# Patient Record
Sex: MALE | Race: WHITE | NOT HISPANIC OR LATINO | Employment: OTHER | URBAN - METROPOLITAN AREA
[De-identification: names, ages, dates, MRNs, and addresses within clinical notes are randomized per-mention and may not be internally consistent; named-entity substitution may affect disease eponyms.]

---

## 2017-11-13 NOTE — PROCEDURE NOTE: CLINICAL
PROCEDURE NOTE: Punctal Plugs, Temporary #2 Bilateral Lower Lids. Diagnosis: SALLY. Anesthesia: Topical. Prep: Antibiotic Drops q 5min x 3. Prior to treatment, the risks/benefits/alternatives were discussed. The patient wished to proceed with procedure. Temporary collagen plugs were inserted. Patient tolerated procedure well. There were no complications. Post procedure instructions given. Quintess 0.4mm RLL and LLL; 1 gtt Proparacaine OU.

## 2019-02-20 NOTE — PATIENT DISCUSSION
Monitor. D/w Dr. Maria T her aware of admission and transfer to [facility] to Dr. Marks service.

## 2019-10-18 ENCOUNTER — CATARACT EVALUATION (OUTPATIENT)
Dept: URBAN - METROPOLITAN AREA CLINIC 39 | Facility: CLINIC | Age: 84
End: 2019-10-18

## 2019-10-18 DIAGNOSIS — H02.883: ICD-10-CM

## 2019-10-18 DIAGNOSIS — H35.3134: ICD-10-CM

## 2019-10-18 DIAGNOSIS — H25.812: ICD-10-CM

## 2019-10-18 DIAGNOSIS — H25.811: ICD-10-CM

## 2019-10-18 DIAGNOSIS — Z79.899: ICD-10-CM

## 2019-10-18 DIAGNOSIS — H02.886: ICD-10-CM

## 2019-10-18 DIAGNOSIS — H04.123: ICD-10-CM

## 2019-10-18 PROCEDURE — 92025-2 CORNEAL TOPOGRAPHY, PT

## 2019-10-18 PROCEDURE — 92134 CPTRZ OPH DX IMG PST SGM RTA: CPT

## 2019-10-18 PROCEDURE — V2799PMN IMPRIMIS PRED-MOXI-NEPAF 5ML

## 2019-10-18 PROCEDURE — 99204 OFFICE O/P NEW MOD 45 MIN: CPT

## 2019-10-18 RX ORDER — ERYTHROMYCIN 5 MG/G: OINTMENT OPHTHALMIC EVERY EVENING

## 2019-10-18 RX ORDER — MINERAL OIL 2; 2 MG/.4ML; MG/.4ML: 1 EMULSION OPHTHALMIC TWICE A DAY

## 2019-10-18 ASSESSMENT — VISUAL ACUITY
OD_CC: >J12
OD_SC: 20/400+1
OS_CC: >J12
OS_SC: 20/400

## 2019-10-18 ASSESSMENT — TONOMETRY
OS_IOP_MMHG: 19
OD_IOP_MMHG: 18

## 2019-11-12 ENCOUNTER — CONSULT (OUTPATIENT)
Dept: URBAN - METROPOLITAN AREA CLINIC 39 | Facility: CLINIC | Age: 84
End: 2019-11-12

## 2019-11-12 VITALS — DIASTOLIC BLOOD PRESSURE: 57 MMHG | SYSTOLIC BLOOD PRESSURE: 109 MMHG | HEART RATE: 79 BPM | HEIGHT: 60 IN

## 2019-11-12 DIAGNOSIS — Z79.899: ICD-10-CM

## 2019-11-12 DIAGNOSIS — H35.3134: ICD-10-CM

## 2019-11-12 DIAGNOSIS — D31.31: ICD-10-CM

## 2019-11-12 PROCEDURE — 9222550 BILAT EXTENDED OPHTHALMOSCOPY, FIRST

## 2019-11-12 PROCEDURE — 92014 COMPRE OPH EXAM EST PT 1/>: CPT

## 2019-11-12 PROCEDURE — 92134 CPTRZ OPH DX IMG PST SGM RTA: CPT

## 2019-11-12 ASSESSMENT — VISUAL ACUITY
OD_SC: 20/200
OS_SC: 20/200-1

## 2019-11-12 ASSESSMENT — TONOMETRY
OD_IOP_MMHG: 15
OS_IOP_MMHG: 16

## 2019-11-19 ENCOUNTER — SURGERY/PROCEDURE (OUTPATIENT)
Dept: URBAN - METROPOLITAN AREA CLINIC 39 | Facility: CLINIC | Age: 84
End: 2019-11-19

## 2019-11-19 ENCOUNTER — ESTABLISHED PATIENT (OUTPATIENT)
Dept: URBAN - METROPOLITAN AREA SURGERY 14 | Facility: SURGERY | Age: 84
End: 2019-11-19

## 2019-11-19 DIAGNOSIS — H27.8: ICD-10-CM

## 2019-11-19 DIAGNOSIS — Z79.899: ICD-10-CM

## 2019-11-19 DIAGNOSIS — D31.31: ICD-10-CM

## 2019-11-19 DIAGNOSIS — H02.883: ICD-10-CM

## 2019-11-19 DIAGNOSIS — H35.722: ICD-10-CM

## 2019-11-19 DIAGNOSIS — H21.81: ICD-10-CM

## 2019-11-19 DIAGNOSIS — H25.812: ICD-10-CM

## 2019-11-19 DIAGNOSIS — H04.123: ICD-10-CM

## 2019-11-19 DIAGNOSIS — H35.3134: ICD-10-CM

## 2019-11-19 DIAGNOSIS — H25.811: ICD-10-CM

## 2019-11-19 DIAGNOSIS — H02.886: ICD-10-CM

## 2019-11-19 PROCEDURE — 99211HP H&P OFFICE/OUTPATIENT VISIT, EST

## 2019-11-19 PROCEDURE — 66999LNSR LENSAR LASER FOR CAT SX

## 2019-11-19 PROCEDURE — 66982CV COMPLEX CATARACT WITH IOL, CUSTOM VISION

## 2019-11-20 ENCOUNTER — CATARACT POST-OP 1-DAY (OUTPATIENT)
Dept: URBAN - METROPOLITAN AREA CLINIC 39 | Facility: CLINIC | Age: 84
End: 2019-11-20

## 2019-11-20 DIAGNOSIS — Z96.1: ICD-10-CM

## 2019-11-20 PROCEDURE — 99024 POSTOP FOLLOW-UP VISIT: CPT

## 2019-11-20 ASSESSMENT — TONOMETRY: OD_IOP_MMHG: 21

## 2019-11-20 ASSESSMENT — VISUAL ACUITY: OD_SC: <J10

## 2019-11-26 ENCOUNTER — POST OP/EVAL OF SECOND EYE (OUTPATIENT)
Dept: URBAN - METROPOLITAN AREA CLINIC 39 | Facility: CLINIC | Age: 84
End: 2019-11-26

## 2019-11-26 DIAGNOSIS — H35.3134: ICD-10-CM

## 2019-11-26 DIAGNOSIS — H04.123: ICD-10-CM

## 2019-11-26 DIAGNOSIS — D31.31: ICD-10-CM

## 2019-11-26 DIAGNOSIS — H35.722: ICD-10-CM

## 2019-11-26 DIAGNOSIS — H26.491: ICD-10-CM

## 2019-11-26 DIAGNOSIS — H02.883: ICD-10-CM

## 2019-11-26 DIAGNOSIS — H02.886: ICD-10-CM

## 2019-11-26 DIAGNOSIS — Z96.1: ICD-10-CM

## 2019-11-26 DIAGNOSIS — H25.812: ICD-10-CM

## 2019-11-26 DIAGNOSIS — Z79.899: ICD-10-CM

## 2019-11-26 PROCEDURE — 92012 INTRM OPH EXAM EST PATIENT: CPT

## 2019-11-26 PROCEDURE — 99024 POSTOP FOLLOW-UP VISIT: CPT

## 2019-11-26 ASSESSMENT — KERATOMETRY: OD_K1POWER_DIOPTERS: 2

## 2019-11-26 ASSESSMENT — VISUAL ACUITY
OS_BAT: 20/400
OD_SC: J10-1
OD_SC: 20/400

## 2019-11-26 ASSESSMENT — TONOMETRY
OD_IOP_MMHG: 10
OS_IOP_MMHG: 16

## 2019-12-03 ENCOUNTER — SURGERY/PROCEDURE (OUTPATIENT)
Dept: URBAN - METROPOLITAN AREA CLINIC 39 | Facility: CLINIC | Age: 84
End: 2019-12-03

## 2019-12-03 ENCOUNTER — ESTABLISHED PATIENT (OUTPATIENT)
Dept: URBAN - METROPOLITAN AREA SURGERY 14 | Facility: SURGERY | Age: 84
End: 2019-12-03

## 2019-12-03 DIAGNOSIS — H02.886: ICD-10-CM

## 2019-12-03 DIAGNOSIS — H35.3134: ICD-10-CM

## 2019-12-03 DIAGNOSIS — H25.812: ICD-10-CM

## 2019-12-03 DIAGNOSIS — H04.123: ICD-10-CM

## 2019-12-03 DIAGNOSIS — H57.03: ICD-10-CM

## 2019-12-03 DIAGNOSIS — D31.31: ICD-10-CM

## 2019-12-03 DIAGNOSIS — H35.722: ICD-10-CM

## 2019-12-03 DIAGNOSIS — H26.491: ICD-10-CM

## 2019-12-03 DIAGNOSIS — H02.883: ICD-10-CM

## 2019-12-03 DIAGNOSIS — Z79.899: ICD-10-CM

## 2019-12-03 PROCEDURE — 66982CV COMPLEX CATARACT WITH IOL, CUSTOM VISION

## 2019-12-03 PROCEDURE — 99211HP H&P OFFICE/OUTPATIENT VISIT, EST

## 2019-12-03 PROCEDURE — 66999LNSR LENSAR LASER FOR CAT SX

## 2019-12-03 ASSESSMENT — KERATOMETRY
OD_K1POWER_DIOPTERS: 2
OD_K1POWER_DIOPTERS: 2

## 2019-12-04 ENCOUNTER — 1 DAY POST-OP (OUTPATIENT)
Dept: URBAN - METROPOLITAN AREA CLINIC 36 | Facility: CLINIC | Age: 84
End: 2019-12-04

## 2019-12-04 DIAGNOSIS — Z96.1: ICD-10-CM

## 2019-12-04 PROCEDURE — 99024 POSTOP FOLLOW-UP VISIT: CPT

## 2019-12-04 ASSESSMENT — VISUAL ACUITY
OD_SC: 20/200
OU_SC: 20/50

## 2019-12-04 ASSESSMENT — TONOMETRY: OS_IOP_MMHG: 28

## 2020-01-10 ENCOUNTER — POST-OP CATARACT (OUTPATIENT)
Dept: URBAN - METROPOLITAN AREA CLINIC 39 | Facility: CLINIC | Age: 85
End: 2020-01-10

## 2020-01-10 DIAGNOSIS — Z96.1: ICD-10-CM

## 2020-01-10 PROCEDURE — 99024 POSTOP FOLLOW-UP VISIT: CPT

## 2020-01-10 RX ORDER — DUREZOL 0.5 MG/ML: 1 EMULSION OPHTHALMIC

## 2020-01-10 RX ORDER — CYCLOPENTOLATE HYDROCHLORIDE 10 MG/ML: 1 SOLUTION/ DROPS OPHTHALMIC TWICE A DAY

## 2020-01-10 ASSESSMENT — VISUAL ACUITY
OD_SC: 20/100
OS_SC: 20/400

## 2021-07-14 NOTE — PROCEDURE NOTE: CLINICAL
PROCEDURE NOTE: Punctal Plugs, Timothy Calle ([id number], [id number]) #2 Bilateral Lower Lids. Prior to treatment, the risks/benefits/alternatives were discussed. The patient wished to proceed with procedure. Temporary collagen plugs were inserted. Patient tolerated procedure well. There were no complications. Post procedure instructions given. Quintess 0.4mm RLL; Eagle Duraplug 0.3mm LLL.

## 2022-01-18 NOTE — PROCEDURE NOTE: CLINICAL
PROCEDURE NOTE: Punctal Plugs, Yao Cheung ([id number], [id number]) #2 Bilateral Lower Lids. Diagnosis: SALLY. Prior to treatment, the risks/benefits/alternatives were discussed. The patient wished to proceed with procedure. Temporary collagen plugs were inserted. Patient tolerated procedure well. There were no complications. Post procedure instructions given. [place] Extended Duration 0.3 BLL.

## 2022-07-18 NOTE — PROCEDURE NOTE: CLINICAL
PROCEDURE NOTE: Punctal Plugs, Chang Mathew ([id number], [id number]) #2 Bilateral Lower Lids. Prior to treatment, the risks/benefits/alternatives were discussed. The patient wished to proceed with procedure. Temporary collagen plugs were inserted. Patient tolerated procedure well. There were no complications. Post procedure instructions given. Quintess 0.4 RLL/ Alanreed 0.3 LLL.

## 2023-01-17 NOTE — PROCEDURE NOTE: CLINICAL
PROCEDURE NOTE: Punctal Plugs, Dissolvable #2 Bilateral Lower Lids. Diagnosis: SALLY. Anesthesia: Proparacaine 0.5%. Prior to treatment, the risks/benefits/alternatives were discussed. The patient wished to proceed with procedure. 1 drop of Proparacaine 0.5% was instilled. Puncta was dilated with punctal dilator. Dissolvable punctal plugs were inserted. Size/location of plugs inserted: *. The patient tolerated the procedure well. There were no complications. Post procedure instructions given. [place] 0.3 BLL.